# Patient Record
Sex: MALE | Race: ASIAN | NOT HISPANIC OR LATINO | URBAN - METROPOLITAN AREA
[De-identification: names, ages, dates, MRNs, and addresses within clinical notes are randomized per-mention and may not be internally consistent; named-entity substitution may affect disease eponyms.]

---

## 2022-05-10 ENCOUNTER — EMERGENCY (EMERGENCY)
Facility: HOSPITAL | Age: 27
LOS: 1 days | Discharge: HOME | End: 2022-05-10
Admitting: EMERGENCY MEDICINE
Payer: SELF-PAY

## 2022-05-10 VITALS
SYSTOLIC BLOOD PRESSURE: 132 MMHG | TEMPERATURE: 98 F | OXYGEN SATURATION: 100 % | DIASTOLIC BLOOD PRESSURE: 93 MMHG | RESPIRATION RATE: 16 BRPM | HEART RATE: 76 BPM

## 2022-05-10 VITALS
OXYGEN SATURATION: 100 % | HEART RATE: 87 BPM | DIASTOLIC BLOOD PRESSURE: 95 MMHG | TEMPERATURE: 98 F | SYSTOLIC BLOOD PRESSURE: 147 MMHG | RESPIRATION RATE: 16 BRPM

## 2022-05-10 PROCEDURE — 99284 EMERGENCY DEPT VISIT MOD MDM: CPT

## 2022-05-10 PROCEDURE — 71046 X-RAY EXAM CHEST 2 VIEWS: CPT | Mod: 26

## 2022-05-10 RX ORDER — IBUPROFEN 200 MG
600 TABLET ORAL ONCE
Refills: 0 | Status: COMPLETED | OUTPATIENT
Start: 2022-05-10 | End: 2022-05-10

## 2022-05-10 RX ADMIN — Medication 600 MILLIGRAM(S): at 19:44

## 2022-05-10 NOTE — ED PROVIDER NOTE - CLINICAL SUMMARY MEDICAL DECISION MAKING FREE TEXT BOX
26 year old female with no known PMH presents to the ED complaining of nasal congestion, dry cough, watery eyes, sore throat and chills for 4 days. HD stable, afebrile. Imp: Viral Syndrome. Plan for CXR, RVP, analgesics, reassess. 26 year old female with no known PMH presents to the ED complaining of nasal congestion, dry cough, watery eyes, sore throat and chills for 4 days. HD stable, afebrile. Imp: Viral Syndrome vs seasonal allergies. Plan for CXR, RVP, analgesics, reassess. 26 year old male with no known PMH presents to the ED complaining of nasal congestion, dry cough, watery eyes, sore throat and chills for 4 days. HD stable, afebrile. Imp: Viral Syndrome vs seasonal allergies. Plan for CXR, RVP, analgesics, reassess.

## 2022-05-10 NOTE — ED ADULT TRIAGE NOTE - CHIEF COMPLAINT QUOTE
c/o dry cough, itchy eyes, nasal congestion, runny nose, sore throat , subjective fevers/chills x 4 days. Denies any PMH

## 2022-05-10 NOTE — ED PROVIDER NOTE - PATIENT PORTAL LINK FT
You can access the FollowMyHealth Patient Portal offered by [facility] by registering at the following website: [email or web address]. By joining [employer]’s FollowMyHealth portal, you will also be able to view your health information using other applications (apps) compatible with our system.

## 2022-05-10 NOTE — ED PROVIDER NOTE - OBJECTIVE STATEMENT
26 year old female with no known PMH presents to the ED complaining of nasal congestion, dry cough, watery eyes, sore throat and chills for 4 days. Denies chest pain, dyspnea, headache, neck pain, stridor, drooling, muffled voice, nausea, vomiting, abdominal pain, weakness, numbness or tingling sensation. Denies any other complaints. No sick contacts. No recent travel. 26 year old male with no known PMH presents to the ED complaining of nasal congestion, dry cough, watery eyes, sore throat and chills for 4 days. Denies chest pain, dyspnea, headache, neck pain, stridor, drooling, muffled voice, nausea, vomiting, abdominal pain, weakness, numbness or tingling sensation. Denies any other complaints. No sick contacts. No recent travel.